# Patient Record
Sex: FEMALE | Race: OTHER | Employment: UNEMPLOYED | URBAN - METROPOLITAN AREA
[De-identification: names, ages, dates, MRNs, and addresses within clinical notes are randomized per-mention and may not be internally consistent; named-entity substitution may affect disease eponyms.]

---

## 2024-01-18 ENCOUNTER — OFFICE VISIT (OUTPATIENT)
Dept: INTERNAL MEDICINE CLINIC | Facility: CLINIC | Age: 44
End: 2024-01-18
Payer: MEDICAID

## 2024-01-18 ENCOUNTER — LAB ENCOUNTER (OUTPATIENT)
Dept: LAB | Facility: HOSPITAL | Age: 44
End: 2024-01-18
Attending: INTERNAL MEDICINE
Payer: MEDICAID

## 2024-01-18 VITALS
HEIGHT: 66 IN | OXYGEN SATURATION: 97 % | BODY MASS INDEX: 29.25 KG/M2 | WEIGHT: 182 LBS | DIASTOLIC BLOOD PRESSURE: 70 MMHG | HEART RATE: 106 BPM | TEMPERATURE: 99 F | SYSTOLIC BLOOD PRESSURE: 106 MMHG

## 2024-01-18 DIAGNOSIS — Z12.11 ENCOUNTER FOR SCREENING COLONOSCOPY: ICD-10-CM

## 2024-01-18 DIAGNOSIS — Z00.00 ANNUAL PHYSICAL EXAM: Primary | ICD-10-CM

## 2024-01-18 DIAGNOSIS — Z00.00 ANNUAL PHYSICAL EXAM: ICD-10-CM

## 2024-01-18 DIAGNOSIS — Z12.31 SCREENING MAMMOGRAM FOR BREAST CANCER: ICD-10-CM

## 2024-01-18 DIAGNOSIS — K59.00 CONSTIPATION, UNSPECIFIED CONSTIPATION TYPE: ICD-10-CM

## 2024-01-18 DIAGNOSIS — Z12.4 PAP SMEAR FOR CERVICAL CANCER SCREENING: ICD-10-CM

## 2024-01-18 LAB
ALBUMIN SERPL-MCNC: 4.3 G/DL (ref 3.2–4.8)
ALBUMIN/GLOB SERPL: 1.6 {RATIO} (ref 1–2)
ALP LIVER SERPL-CCNC: 46 U/L
ALT SERPL-CCNC: 12 U/L
ANION GAP SERPL CALC-SCNC: 0 MMOL/L (ref 0–18)
AST SERPL-CCNC: 16 U/L (ref ?–34)
BASOPHILS # BLD AUTO: 0.04 X10(3) UL (ref 0–0.2)
BASOPHILS NFR BLD AUTO: 0.7 %
BILIRUB SERPL-MCNC: 0.9 MG/DL (ref 0.3–1.2)
BUN BLD-MCNC: 12 MG/DL (ref 9–23)
BUN/CREAT SERPL: 14.8 (ref 10–20)
CALCIUM BLD-MCNC: 9.3 MG/DL (ref 8.7–10.4)
CHLORIDE SERPL-SCNC: 112 MMOL/L (ref 98–112)
CHOLEST SERPL-MCNC: 187 MG/DL (ref ?–200)
CO2 SERPL-SCNC: 29 MMOL/L (ref 21–32)
CREAT BLD-MCNC: 0.81 MG/DL
DEPRECATED RDW RBC AUTO: 43 FL (ref 35.1–46.3)
EGFRCR SERPLBLD CKD-EPI 2021: 91 ML/MIN/1.73M2 (ref 60–?)
EOSINOPHIL # BLD AUTO: 0.07 X10(3) UL (ref 0–0.7)
EOSINOPHIL NFR BLD AUTO: 1.2 %
ERYTHROCYTE [DISTWIDTH] IN BLOOD BY AUTOMATED COUNT: 12.5 % (ref 11–15)
EST. AVERAGE GLUCOSE BLD GHB EST-MCNC: 105 MG/DL (ref 68–126)
FASTING PATIENT LIPID ANSWER: YES
FASTING STATUS PATIENT QL REPORTED: YES
GLOBULIN PLAS-MCNC: 2.7 G/DL (ref 2.8–4.4)
GLUCOSE BLD-MCNC: 87 MG/DL (ref 70–99)
HBA1C MFR BLD: 5.3 % (ref ?–5.7)
HCT VFR BLD AUTO: 43 %
HDLC SERPL-MCNC: 40 MG/DL (ref 40–59)
HGB BLD-MCNC: 14.3 G/DL
IMM GRANULOCYTES # BLD AUTO: 0.01 X10(3) UL (ref 0–1)
IMM GRANULOCYTES NFR BLD: 0.2 %
LDLC SERPL CALC-MCNC: 129 MG/DL (ref ?–100)
LYMPHOCYTES # BLD AUTO: 1.6 X10(3) UL (ref 1–4)
LYMPHOCYTES NFR BLD AUTO: 27.4 %
MCH RBC QN AUTO: 31.3 PG (ref 26–34)
MCHC RBC AUTO-ENTMCNC: 33.3 G/DL (ref 31–37)
MCV RBC AUTO: 94.1 FL
MONOCYTES # BLD AUTO: 0.43 X10(3) UL (ref 0.1–1)
MONOCYTES NFR BLD AUTO: 7.4 %
NEUTROPHILS # BLD AUTO: 3.7 X10 (3) UL (ref 1.5–7.7)
NEUTROPHILS # BLD AUTO: 3.7 X10(3) UL (ref 1.5–7.7)
NEUTROPHILS NFR BLD AUTO: 63.1 %
NONHDLC SERPL-MCNC: 147 MG/DL (ref ?–130)
OSMOLALITY SERPL CALC.SUM OF ELEC: 291 MOSM/KG (ref 275–295)
PLATELET # BLD AUTO: 216 10(3)UL (ref 150–450)
POTASSIUM SERPL-SCNC: 4.7 MMOL/L (ref 3.5–5.1)
PROT SERPL-MCNC: 7 G/DL (ref 5.7–8.2)
RBC # BLD AUTO: 4.57 X10(6)UL
SODIUM SERPL-SCNC: 141 MMOL/L (ref 136–145)
TRIGL SERPL-MCNC: 96 MG/DL (ref 30–149)
TSI SER-ACNC: 3.7 MIU/ML (ref 0.55–4.78)
VLDLC SERPL CALC-MCNC: 17 MG/DL (ref 0–30)
WBC # BLD AUTO: 5.9 X10(3) UL (ref 4–11)

## 2024-01-18 PROCEDURE — 36415 COLL VENOUS BLD VENIPUNCTURE: CPT | Performed by: INTERNAL MEDICINE

## 2024-01-18 PROCEDURE — 85025 COMPLETE CBC W/AUTO DIFF WBC: CPT | Performed by: INTERNAL MEDICINE

## 2024-01-18 PROCEDURE — 80061 LIPID PANEL: CPT | Performed by: INTERNAL MEDICINE

## 2024-01-18 PROCEDURE — 84443 ASSAY THYROID STIM HORMONE: CPT

## 2024-01-18 PROCEDURE — 83036 HEMOGLOBIN GLYCOSYLATED A1C: CPT

## 2024-01-18 PROCEDURE — 80053 COMPREHEN METABOLIC PANEL: CPT

## 2024-01-18 PROCEDURE — 99386 PREV VISIT NEW AGE 40-64: CPT | Performed by: INTERNAL MEDICINE

## 2024-01-18 RX ORDER — MULTIVITAMIN WITH IRON
50 TABLET ORAL DAILY
COMMUNITY

## 2024-01-18 RX ORDER — SENNOSIDES A AND B 8.6 MG/1
1 TABLET, FILM COATED ORAL 2 TIMES DAILY PRN
Qty: 60 TABLET | Refills: 1 | Status: SHIPPED | OUTPATIENT
Start: 2024-01-18

## 2024-01-23 NOTE — PROGRESS NOTES
[facility]  New Patient History and Physical      HPI:     Chief Complaint   Patient presents with    [facility]    Abdominal Pain       Enedelia Bello is a 44 year old female presenting for:  Establish care.  Has  has no past medical history on file.      Reports hx of constipation.   Pain with bloating.      Requesting preventative lab analysis.      Labs:   Complete Metabolic Panel:  Lab Results   Component Value Date/Time     01/18/2024 11:35 AM    K 4.7 01/18/2024 11:35 AM     01/18/2024 11:35 AM    CO2 29.0 01/18/2024 11:35 AM    CREATSERUM 0.81 01/18/2024 11:35 AM    CA 9.3 01/18/2024 11:35 AM    GLU 87 01/18/2024 11:35 AM    TP 7.0 01/18/2024 11:35 AM    ALB 4.3 01/18/2024 11:35 AM    ALKPHO 46 01/18/2024 11:35 AM    AST 16 01/18/2024 11:35 AM    ALT 12 01/18/2024 11:35 AM    BILT 0.9 01/18/2024 11:35 AM    TSH 3.703 01/18/2024 11:35 AM        CBC:  @LABRCNTIP(RBC:3,HGB:3,HCT:3,MCV:3,MCH:3,MCHC:3,RDW:3,NEPRELIM:3,WBC:3,PLT:3)@       Hemoglobin A1C, Microalbumin  Lab Results   Component Value Date/Time    A1C 5.3 01/18/2024 11:35 AM        Lipid panel  Lab Results   Component Value Date/Time    CHOLEST 187 01/18/2024 11:35 AM    HDL 40 01/18/2024 11:35 AM    TRIG 96 01/18/2024 11:35 AM     (H) 01/18/2024 11:35 AM    NONHDLC 147 (H) 01/18/2024 11:35 AM     The 10-year ASCVD risk score (Uli MURDOCK, et al., 2019) is: 0.8%    Values used to calculate the score:      Age: 44 years      Sex: Female      Is Non- : No      Diabetic: No      Tobacco smoker: No      Systolic Blood Pressure: 106 mmHg      Is BP treated: No      HDL Cholesterol: 40 mg/dL      Total Cholesterol: 187 mg/dL       Medications:  Current Outpatient Medications   Medication Sig Dispense Refill    Bisacodyl (STIMULANT LAXATIVE OR) Take by mouth.      vitamin B-12 50 MCG Oral Tab Take 1 tablet (50 mcg total) by mouth daily.      sennosides (SENOKOT) 8.6 MG Oral Tab  Take 1 tablet (8.6 mg total) by mouth 2 (two) times daily as needed. 60 tablet 1      PMH:  History reviewed. No pertinent past medical history.      PSH:  Past Surgical History:   Procedure Laterality Date    APPENDECTOMY      CHOLECYSTECTOMY         Allergies:  Allergies   Allergen Reactions    Penicillins SWELLING     eyes      Social History:  Social History     Socioeconomic History    Marital status:    Tobacco Use    Smoking status: Never     Passive exposure: Never    Smokeless tobacco: Never   Substance and Sexual Activity    Alcohol use: Never    Drug use: Never        Family History:  Family History   Problem Relation Age of Onset    Heart Disorder Father     No Known Problems Mother     No Known Problems Daughter     No Known Problems Son           REVIEW OF SYSTEMS:   Review of Systems   Constitutional:  Negative for chills, fatigue, fever and unexpected weight change.   HENT:  Negative for congestion, ear pain, hearing loss, rhinorrhea, sinus pain and sore throat.    Eyes:  Negative for pain, redness and visual disturbance.   Respiratory:  Negative for apnea, cough, chest tightness, shortness of breath and wheezing.    Cardiovascular:  Negative for chest pain, palpitations and leg swelling.   Gastrointestinal:  Positive for abdominal pain and constipation. Negative for abdominal distention, blood in stool and nausea.   Endocrine: Negative for cold intolerance, heat intolerance and polyuria.   Genitourinary:  Negative for dysuria, hematuria and urgency.   Musculoskeletal:  Negative for arthralgias, back pain, gait problem, joint swelling, myalgias and neck pain.   Skin:  Negative for rash and wound.   Allergic/Immunologic: Negative for food allergies and immunocompromised state.   Neurological:  Negative for dizziness, seizures, facial asymmetry, speech difficulty, weakness, light-headedness, numbness and headaches.   Hematological:  Negative for adenopathy. Does not bruise/bleed easily.    Psychiatric/Behavioral:  Negative for behavioral problems, sleep disturbance and suicidal ideas. The patient is not nervous/anxious.             PHYSICAL EXAM:   /70 (BP Location: Right arm, Patient Position: Sitting, Cuff Size: adult)   Pulse 106   Temp 99.1 °F (37.3 °C) (Temporal)   Ht 5' 6\" (1.676 m)   Wt 182 lb (82.6 kg)   LMP 01/09/2024   SpO2 97%   BMI 29.38 kg/m²  Estimated body mass index is 29.38 kg/m² as calculated from the following:    Height as of this encounter: 5' 6\" (1.676 m).    Weight as of this encounter: 182 lb (82.6 kg).     Wt Readings from Last 3 Encounters:   01/18/24 182 lb (82.6 kg)       Physical Exam  Vitals reviewed.   Constitutional:       General: She is not in acute distress.     Appearance: She is well-developed.   HENT:      Head: Normocephalic and atraumatic.   Eyes:      Conjunctiva/sclera: Conjunctivae normal.      Pupils: Pupils are equal, round, and reactive to light.   Neck:      Thyroid: No thyromegaly.   Cardiovascular:      Rate and Rhythm: Normal rate and regular rhythm.      Heart sounds: Normal heart sounds, S1 normal and S2 normal. No murmur heard.     No friction rub. No gallop.   Pulmonary:      Effort: Pulmonary effort is normal. No respiratory distress.      Breath sounds: Normal breath sounds. No wheezing or rales.   Chest:      Chest wall: No tenderness.   Abdominal:      General: Bowel sounds are normal. There is no distension.      Palpations: Abdomen is soft. There is no mass.      Tenderness: There is no abdominal tenderness. There is no guarding or rebound.   Musculoskeletal:         General: No tenderness. Normal range of motion.      Cervical back: Normal range of motion.   Lymphadenopathy:      Cervical: No cervical adenopathy.   Skin:     General: Skin is warm.      Findings: No erythema or rash.   Neurological:      Mental Status: She is alert and oriented to person, place, and time.      Cranial Nerves: No cranial nerve deficit.      Deep  Tendon Reflexes: Reflexes are normal and symmetric.   Psychiatric:         Behavior: Behavior normal.         Thought Content: Thought content normal.         Judgment: Judgment normal.             ASSESSMENT AND PLAN:   Patient is a 44 year old female who presents primarily presents for:            (Z00.00) Annual physical exam  (primary encounter diagnosis)  Plan: Hemoglobin A1C (Glycohemoglobin) [E], Comp         Metabolic Panel (14) [E], CBC With Differential        With Platelet, Lipid Panel          During the annual physical exam I spent extensive time discussing medical history including existing conditions, past surgeries, allergies and medications.  Lifestyle factors discussed including diet, exercise and substance abuse including alcohol and tobacco if warranted.  Counseled on screening tests based on age and underlying risk factors which may include but not limited to blood pressure measurement, cholesterol screening, diabetes screening and prostate cancer screening.  Preventative screenings discussed.  Reviewed immunizations.  Sexual health discussed if appropriate.  Family history reviewed.  I addressed any specific concerns or symptoms that the patient had.        (Z12.11) Encounter for screening colonoscopy  Plan: Gastro GI Telephone Colon Screen            (K59.00) Constipation, unspecified constipation type  Plan: TSH W Reflex To Free T4 [E]            (Z12.4) Pap smear for cervical cancer screening  Plan: OBG Referral - In Network            (Z12.31) Screening mammogram for breast cancer  Plan: [facility] 2D+3D SCREENING BILAT         (CPT=77067/35437)                 Health Maintenance:  Health Maintenance   Topic Date Due    Annual Physical  Never done    Colorectal Cancer Screening  Never done    Mammogram  Never done    COVID-19 Vaccine (1) Never done    DTaP,Tdap,and Td Vaccines (1 - Tdap) Never done    Pap Smear  Never done    Influenza Vaccine (1) Never done    Annual Depression Screening   Never done    Pneumococcal Vaccine: Birth to 64yrs  Aged Out             Meds & Refills for this Visit:  Requested Prescriptions     Signed Prescriptions Disp Refills    sennosides (SENOKOT) 8.6 MG Oral Tab 60 tablet 1     Sig: Take 1 tablet (8.6 mg total) by mouth 2 (two) times daily as needed.       Orders Placed This Encounter   Procedures    TSH W Reflex To Free T4 [E]    Hemoglobin A1C (Glycohemoglobin) [E]    Comp Metabolic Panel (14) [E]    CBC With Differential With Platelet    Lipid Panel       Imaging & Consults:  OBG - INTERNAL  OP REFERRAL TO [facility] GI TELEPHONE COLON SCREEN  [facility] 2D+3D SCREENING BILAT (CPT=77067/91148)        Caesar Carney MD     Return in about 6 months (around 7/18/2024).  Important issues to follow up on next visit      Patient indicates understanding of the above recommendations and agrees to the above plan.  Reasurrance and education provided. All questions answered.  Notified to call with any questions, complications, allergies, or worsening or changing symptoms as well as any side effects or complications from the treatments .  Red flags/ ER precautions discussed.    This note was produced using voice recognition software.  As a result, errors may occur.  When identified, these errors have been corrected.  While every attempt is made to correct errors during dictation, errors may still exist.    Total time spent was 48 minutes which includes: Preparation to see patient including chart review, reviewing appropriate medical history, cunseling and education (diet and exercise), discussing treatment options, ordering appropriate diagnostic tests and documentation.    Caesar Carney MD  Internal Medicine/Primary Care  [facility]

## 2024-03-21 ENCOUNTER — TELEPHONE (OUTPATIENT)
Facility: CLINIC | Age: 44
End: 2024-03-21

## 2024-03-21 ENCOUNTER — OFFICE VISIT (OUTPATIENT)
Facility: CLINIC | Age: 44
End: 2024-03-21
Payer: MEDICAID

## 2024-03-21 VITALS
SYSTOLIC BLOOD PRESSURE: 123 MMHG | HEART RATE: 82 BPM | HEIGHT: 66 IN | WEIGHT: 186 LBS | DIASTOLIC BLOOD PRESSURE: 84 MMHG | BODY MASS INDEX: 29.89 KG/M2

## 2024-03-21 DIAGNOSIS — R14.0 ABDOMINAL BLOATING: Primary | ICD-10-CM

## 2024-03-21 DIAGNOSIS — M62.89 PELVIC FLOOR DYSFUNCTION: ICD-10-CM

## 2024-03-21 DIAGNOSIS — Z12.11 COLON CANCER SCREENING: Primary | ICD-10-CM

## 2024-03-21 DIAGNOSIS — K59.04 CHRONIC IDIOPATHIC CONSTIPATION: ICD-10-CM

## 2024-03-21 NOTE — H&P
[facility] - Gastroenterology                                                                                                               Reason for consult: GERD    Requesting physician or provider: Caesar Carney MD    Chief Complaint   Patient presents with    Colonoscopy Screening    Constipation    Gastro-esophageal Reflux       HPI:   Enedelia Bello is a 44 year old year-old female with history of appendectomy, cholecystectomy who presents for constipation and incomplete evacuation.    -Albanian speaking patient, she would like her daughter/ to translate.  offered she declines  -having problems using bathroom  -lower ab pain, bloating  -when pain is severe, it travels to the legs and back  -she moves her bowels, but cannot move her bowels, 20 min, nothing coming out  -feeling of incomplete evacuation  -vaginal deliveries in the past after pregnancies  -History of stress urinary incontinence  - She has not tried any consistent laxatives  - Feels it is not easy to have a bowel movement, the symptoms have been going on since 2020 she says  - Denies any family history of GI malignancy  - No melena or hematochezia  - No odynophagia or dysphagia  - No unusual weight loss  - No NSAID use or and no blood thinner use      Prior endoscopies:  none    Soc:  -no smoking  -no Etoh    Wt Readings from Last 6 Encounters:   03/21/24 186 lb (84.4 kg)   01/18/24 182 lb (82.6 kg)        History, Medications, Allergies, ROS:      History reviewed. No pertinent past medical history.   Past Surgical History:   Procedure Laterality Date    APPENDECTOMY      CHOLECYSTECTOMY        Family Hx:   Family History   Problem Relation Age of Onset    Heart Disorder Father     No Known Problems Mother     No Known Problems Daughter     No Known Problems Son     Colon Cancer Neg       Social History:   Social History      Socioeconomic History    Marital status:    Tobacco Use    Smoking status: Never     Passive exposure: Never    Smokeless tobacco: Never   Substance and Sexual Activity    Alcohol use: Never    Drug use: Never        Medications (Active prior to today's visit):  Current Outpatient Medications   Medication Sig Dispense Refill    Bisacodyl (STIMULANT LAXATIVE OR) Take by mouth.      vitamin B-12 50 MCG Oral Tab Take 1 tablet (50 mcg total) by mouth daily.      sennosides (SENOKOT) 8.6 MG Oral Tab Take 1 tablet (8.6 mg total) by mouth 2 (two) times daily as needed. 60 tablet 1       Allergies:  Allergies   Allergen Reactions    Penicillins SWELLING     eyes       ROS:   CONSTITUTIONAL:  negative for fevers, rigors  EYES:  negative for diplopia   RESPIRATORY:  negative for severe shortness of breath  CARDIOVASCULAR:  negative for crushing sub-sternal chest pain  GASTROINTESTINAL:  see HPI  GENITOURINARY:  negative for dysuria or gross hematuria  INTEGUMENT/BREAST:  SKIN:  negative for jaundice   ALLERGIC/IMMUNOLOGIC:  negative for hay fever  ENDOCRINE:  negative for cold intolerance and heat intolerance  MUSCULOSKELETAL:  negative for joint effusion/severe erythema  BEHAVIOR/PSYCH:  negative for psychotic behavior      PHYSICAL EXAM:   Blood pressure 123/84, pulse 82, height 5' 6\" (1.676 m), weight 186 lb (84.4 kg), last menstrual period 01/09/2024.    Gen- Patient appears comfortable and in no acute discomfort  HEENT: the sclera appears anicteric, oropharynx clear, mucus membranes appear moist  CV- regular rate and rhythm, the extremities are warm and well perfused   Lung- Moves air well; No labored breathing  Abdomen- soft, non-tender exam in all quadrants without rigidity or guarding, non-distended, no abnormal bowel sounds noted, no masses are palpated  Skin- No jaundice  Ext: no cyanosis, clubbing or edema is evident.   Neuro- Alert and interactive, and gross movements of extremities normal  Psych -  appropriate, non-agitated    Labs/Imaging:     Patient's pertinent labs and imaging were reviewed and discussed with patient today.      ASSESSMENT/PLAN:   Enedelia Bello is a 44 year old year-old female with history of appendectomy, cholecystectomy who presents for constipation and incomplete evacuation. Romanian speaking.    #Chronic constipation  #Pelvic floor dysfunction  #Stress urinary incontinence    >> Symptoms ongoing for the last 4 years, she has a history of vaginal deliveries and signs and symptoms of pelvic floor dysfunction.  She has no rectal bleeding or weight loss or any red flag symptoms.  However she has not had a colonoscopy and I do recommend this.  If the colonoscopy is normal I suspect that she will need pelvic floor physical therapy.  In the meantime we will give her a flush out with GoLytely, risks and benefits discussed.  After that she should take MiraLAX and Dulcolax on a consistent basis.  Patient and family agree to plan all questions answered.    Recommendations:    1. Schedule colonoscopy with IV or MAC [Diagnosis: screening]    2.  bowel prep from pharmacy (split [employer])  --Buy over the counter dulcolax laxative, and take one tablet daily for 3 days prior to drinking the bowel prep.     3. Continue all medications as normal for your procedure.    4. Read all bowel prep instructions carefully. Bowel prep instructions can also be found online at:  [email or web address].org/giprep     5. AVOID seeds, nuts, popcorn, raw fruits and vegetables for 3 days before procedure    6. You MAY need to go for COVID testing 72 hours before procedure. The testing team will call you a few days before your procedure to discuss with you if testing is required. If you are asked to go for COVID testing and do not completed the test, the procedure cannot be performed.     7. If you start any NEW medication after your visit today, please notify us. Certain medications (like iron or weight loss  medications) will need to be held before the procedure, or the procedure cannot be performed safely.        8. DO flushout first and then the next day start miralax twice a day (1 tablespoon with water) and add dulcolax three time a week (mon,wed,Friday).    9. Correct toilet positioning discussed      ------------    WASHOUT INSTRUCTIONS:  1. Pick a day you will be at home, close to a toilet.  2. Have a some juice for breakfast.   3. Around 8AM start drinking the solution prescribed (for trilyte, drink 1 cup every 15 minutes) over the course of 3-5 hours. IF having nausea/bloating, take a break for 30 minutes, walk around and then resume drinking. If vomiting, take a break for 1 hour, if symptoms improve, and you feel well, can resume drinking.  4. Goal is to finish solution or until stools turn liquid yellow.  5. For lunch you should have a liquid diet (7up, water, gingerale, etc)  6. After prep, for dinner you can have a light dinner.  7. Resume regular meals the following day, along with laxative medications.  8. You should continue your regular medications during the washout day.         Colonoscopy consent: I have discussed the risks, benefits, and alternatives to colonoscopy with the patient/primary decision maker [who demonstrated understanding], including but not limited to the risks of bleeding, infection, pain, death, as well as the risks of anesthesia and perforation all leading to prolonged hospitalization, surgical intervention, or even death. I also specifically mentioned the miss rate of colonoscopy of 5-10% in the best of all circumstances. The patient has agreed to sign an informed consent and elected to proceed with colonoscopy with possible intervention [i.e. polypectomy, stent placement, etc.] as indicated.         Orders This Visit:  No orders of the defined types were placed in this encounter.      Meds This Visit:  Requested Prescriptions      No prescriptions requested or ordered in this  encounter       Imaging & Referrals:  None         LOREN Vines MD  Pager: [phone]  3/21/2024        This note was partially prepared using Dragon Medical voice recognition dictation software. As a result, errors may occur. When identified, these errors have been corrected. While every attempt is made to correct errors during dictation, discrepancies may still exist.

## 2024-03-21 NOTE — TELEPHONE ENCOUNTER
30 min / IV sedation ok per Dr Vines       Scheduled for:  Colonoscopy [address]     Location:  [facility] ([facility])  Provider:  Dr Vines    Date:  4/30/2024  Time:   3:30PM (Patient aware to arrive ONE hour early)    Sedation:  IV (Conscious sedation )   Prep:  Split GoLytely -  dulcolax laxative, and take one tablet daily for 3 days prior to drinking the bowel prep.        Diagnosis with codes:    ICD-10-CM   1. Colon cancer screening  Z12.11        Meds/Allergies Reconciled?:   Provider Reviewed   Was patient informed to call insurance with codes (Y/N):  Yes  Referral sent?: Referral was sent at the time of electronic surgical scheduling.  [facility] or [facility] notified?: I sent an electronic request to ENDO Scheduling and received a confirmation today.     Medication Orders:  Patient is aware to NOT take iron pills, herbal meds and diet supplements for 7 days before exam. Also to NOT take any form of alcohol, recreational drugs and any forms of ED meds 24 hours before exam.       Misc Orders:  N/A   Further instructions given by staff:  I Provided prep instructions to patient and reviewed date, time and location. Patient verbalized that  She / Her understood and is aware to call with any questions.  Patient was informed about the new cancellation policy for She / Her procedure. Patient was also given copy of the cancellation policy at the time of the appointment and verbalized understanding.

## 2024-03-21 NOTE — PATIENT INSTRUCTIONS
1. Schedule colonoscopy with IV or MAC [Diagnosis: screening]    2.  bowel prep from pharmacy ([employer])  --Buy over the counter dulcolax laxative, and take one tablet daily for 3 days prior to drinking the bowel prep.     3. Continue all medications as normal for your procedure.    4. Read all bowel prep instructions carefully. Bowel prep instructions can also be found online at:  www.health.org/giprep     5. AVOID seeds, nuts, popcorn, raw fruits and vegetables for 3 days before procedure    6. You MAY need to go for COVID testing 72 hours before procedure. The testing team will call you a few days before your procedure to discuss with you if testing is required. If you are asked to go for COVID testing and do not completed the test, the procedure cannot be performed.     7. If you start any NEW medication after your visit today, please notify us. Certain medications (like iron or weight loss medications) will need to be held before the procedure, or the procedure cannot be performed safely.        8. DO flushout first and then the next day start miralax twice a day (1 tablespoon with water) and add dulcolax three time a week (mon,wed,Friday).        ------------    WASHOUT INSTRUCTIONS:  1. Pick a day you will be at home, close to a toilet.  2. Have a some juice for breakfast.   3. Around 8AM start drinking the solution prescribed (for trilyte, drink 1 cup every 15 minutes) over the course of 3-5 hours. IF having nausea/bloating, take a break for 30 minutes, walk around and then resume drinking. If vomiting, take a break for 1 hour, if symptoms improve, and you feel well, can resume drinking.  4. Goal is to finish solution or until stools turn liquid yellow.  5. For lunch you should have a liquid diet (7up, water, gingerale, etc)  6. After prep, for dinner you can have a light dinner.  7. Resume regular meals the following day, along with laxative medications.  8. You should continue your regular  medications during the washout day.

## 2024-04-26 ENCOUNTER — TELEPHONE (OUTPATIENT)
Facility: CLINIC | Age: 44
End: 2024-04-26

## 2024-04-30 ENCOUNTER — HOSPITAL ENCOUNTER (OUTPATIENT)
Facility: HOSPITAL | Age: 44
Setting detail: HOSPITAL OUTPATIENT SURGERY
Discharge: HOME | End: 2024-04-30
Attending: INTERNAL MEDICINE | Admitting: INTERNAL MEDICINE
Payer: MEDICAID

## 2024-04-30 VITALS
WEIGHT: 185 LBS | HEART RATE: 62 BPM | SYSTOLIC BLOOD PRESSURE: 90 MMHG | RESPIRATION RATE: 18 BRPM | BODY MASS INDEX: 29.73 KG/M2 | DIASTOLIC BLOOD PRESSURE: 54 MMHG | HEIGHT: 66 IN | OXYGEN SATURATION: 97 %

## 2024-04-30 DIAGNOSIS — Z12.11 COLON CANCER SCREENING: ICD-10-CM

## 2024-04-30 PROBLEM — K64.8 INTERNAL HEMORRHOIDS: Status: ACTIVE | Noted: 2024-04-30

## 2024-04-30 PROBLEM — Q43.8 TORTUOUS COLON: Status: ACTIVE | Noted: 2024-04-30

## 2024-04-30 LAB — B-HCG UR QL: NEGATIVE

## 2024-04-30 PROCEDURE — 99152 MOD SED SAME PHYS/QHP 5/>YRS: CPT | Performed by: INTERNAL MEDICINE

## 2024-04-30 PROCEDURE — 0DJD8ZZ INSPECTION OF LOWER INTESTINAL TRACT, VIA NATURAL OR ARTIFICIAL OPENING ENDOSCOPIC: ICD-10-PCS | Performed by: INTERNAL MEDICINE

## 2024-04-30 PROCEDURE — 81025 URINE PREGNANCY TEST: CPT

## 2024-04-30 PROCEDURE — 99153 MOD SED SAME PHYS/QHP EA: CPT | Performed by: INTERNAL MEDICINE

## 2024-04-30 RX ORDER — MIDAZOLAM HYDROCHLORIDE 1 MG/ML
INJECTION INTRAMUSCULAR; INTRAVENOUS
Status: DISCONTINUED | OUTPATIENT
Start: 2024-04-30 | End: 2024-04-30

## 2024-04-30 RX ORDER — SODIUM CHLORIDE 0.9 % (FLUSH) 0.9 %
10 SYRINGE (ML) INJECTION AS NEEDED
Status: DISCONTINUED | OUTPATIENT
Start: 2024-04-30 | End: 2024-04-30

## 2024-04-30 RX ORDER — MIDAZOLAM HYDROCHLORIDE 1 MG/ML
1 INJECTION INTRAMUSCULAR; INTRAVENOUS EVERY 5 MIN PRN
Status: DISCONTINUED | OUTPATIENT
Start: 2024-04-30 | End: 2024-04-30

## 2024-04-30 RX ORDER — SODIUM CHLORIDE, SODIUM LACTATE, POTASSIUM CHLORIDE, CALCIUM CHLORIDE 600; 310; 30; 20 MG/100ML; MG/100ML; MG/100ML; MG/100ML
INJECTION, SOLUTION INTRAVENOUS CONTINUOUS
Status: DISCONTINUED | OUTPATIENT
Start: 2024-04-30 | End: 2024-04-30

## 2024-04-30 NOTE — DISCHARGE INSTRUCTIONS
Home Care Instructions for Colonoscopy and/or Gastroscopy with Sedation    Diet:  - Resume your regular diet as tolerated unless otherwise instructed.  - Start with light meals to minimize bloating.  - Do not drink alcohol today.    Medication:  - If you have questions about resuming your normal medications, please contact your Primary Care Physician.    Activities:  - Take it easy today. Do not return to work today.  - Do not drive today.  - Do not operate any machinery today (including kitchen equipment).    Colonoscopy:  - You may notice some rectal \"spotting\" (a little blood on the toilet tissue) for a day or two after the exam. This is normal.  - If you experience any rectal bleeding (not spotting), persistent tenderness or sharp severe abdominal pains, oral temperature over 100 degrees Fahrenheit, light-headedness or dizziness, or any other problems, contact your doctor.      **If unable to reach your doctor, please go to the [facility] Emergency Room**    - Your referring physician will receive a full report of your examination.  - If you do not hear from your doctor's office within two weeks of your biopsy, please call them for your results.    You may be able to see your laboratory results in [employer] between 4 and 7 business days.  In some cases, your physician may not have viewed the results before they are released to [employer].  If you have questions regarding your results contact the physician who ordered the test/exam by phone or via [employer]â„[employer] by choosing \"Ask a Medical Question.\"

## 2024-04-30 NOTE — H&P
History & Physical Examination    Patient Name: Enedelia Bello  MRN: [id number]  [facility]: [id number]  YOB: 1980    Diagnosis: screening for colon cancer    Medications Prior to Admission   Medication Sig Dispense Refill Last Dose    Bisacodyl (STIMULANT LAXATIVE OR) Take by mouth.       vitamin B-12 50 MCG Oral Tab Take 1 tablet (50 mcg total) by mouth daily.   2024    sennosides (SENOKOT) 8.6 MG Oral Tab Take 1 tablet (8.6 mg total) by mouth 2 (two) times daily as needed. 60 tablet 1 2024    [] polyethylene glycol, PEG 3350-KCl-NaBcb-NaCl-NaSulf, 236 g Oral Recon Soln Take 4,000 mL by mouth once for 1 dose. Take as directed by your Gastroenterology clinic. 4000 mL 0     [] polyethylene glycol, PEG 3350-KCl-NaBcb-NaCl-NaSulf, 236 g Oral Recon Soln Take 4,000 mL by mouth once for 1 dose. As directed by GI clinic instructions. 4000 mL 0      Current Facility-Administered Medications   Medication Dose Route Frequency    sodium chloride 0.9% 0.9% flush injection 10 mL  10 mL Intravenous PRN    lactated ringers infusion   Intravenous Continuous    midazolam (Versed) 2 MG/2ML injection 1 mg  1 mg Intravenous Q5 Min PRN    fentaNYL (Sublimaze) 50 mcg/mL injection 25 mcg  25 mcg Intravenous Q5 Min PRN       Allergies:   Allergies   Allergen Reactions    Penicillins SWELLING     eyes       Past Medical History:    High cholesterol     Past Surgical History:   Procedure Laterality Date    Appendectomy      Cholecystectomy       Family History   Problem Relation Age of Onset    Heart Disorder Father     No Known Problems Mother     No Known Problems Daughter     No Known Problems Son     Colon Cancer Neg      Social History     Tobacco Use    Smoking status: Never     Passive exposure: Never    Smokeless tobacco: Never   Substance Use Topics    Alcohol use: Never       SYSTEM Check if Review is Normal Check if Physical Exam is Normal If not normal, please explain:   HEENT [X ] [ X]     NECK  [X ] [ X]    HEART [X ] [ X]    LUNGS [X ] [ X]    ABDOMEN [X ] [ X]    EXTREMITIES [X ] [ X]    OTHER        I have discussed the risks and benefits and alternatives of the procedure with the patient/family.  They understand and agree to proceed with plan of care.   I have reviewed the History and Physical done within the last 30 days.  Any changes noted above.    LOREN Vines MD  [facility] - Gastroenterology  4/30/2024  3:13 PM

## 2024-04-30 NOTE — OPERATIVE REPORT
COLONOSCOPY REPORT    Enedelia Bello     1980 Age 44 year old   PCP Caesar Carney MD Endoscopist Jack Vines MD     Date of procedure: 24    Procedure: Colonoscopy     Pre-operative diagnosis: screening    Post-operative diagnosis: tortuous colon, internal hemorrhoids    Medications: Versed 8 mg IV push.  Fentanyl 100 mcg IV push. [Per my order and under my supervision, the patient was sedated with intermittent intravenous doses of versed and fentanyl. The vital signs were monitored and recorded by an experienced RN. The procedure started after the patient was adequately sedated. Total time for moderate conscious sedation was 18 minutes].    Withdrawal time: 8 minutes    Procedure:  Informed consent was obtained from the patient after the risks of the procedure were discussed, including but not limited to bleeding, perforation, aspiration, infection, or possibility of a missed lesion. After discussions of the risks/benefits and alternatives to this procedure, as well as the planned sedation, the patient was placed in the left lateral decubitus position and begun on continuous blood pressure pulse oximetry and EKG monitoring and this was maintained throughout the procedure. Once an adequate level of sedation was obtained a digital rectal exam was completed. Then the lubricated tip of the Htnarat-XDWMD-299 diagnostic video colonoscope was inserted and advanced without difficulty to the cecum using the CO2 insufflation technique. The cecum was identified by localizing the trifold, the appendix and the ileocecal valve. Withdrawal was begun with thorough washing and careful examination of the colonic walls and folds. A routine second examination of the cecum/ascending colon was performed. Photodocumentation was obtained. The bowel prep was good. Views of the colon were good with washing. I then carefully withdrew the instrument from the patient who tolerated the procedure well.      Complications: none.    Findings:   1. No polyp(s) noted.    2. Diverticulosis: none.    3. Terminal ileum: the visualized mucosa appeared normal.    4. The colonic mucosa throughout the colon showed normal vascular pattern, without evidence of angioectasias or inflammation. Tortuous sigmoid colon.    5. A retroflexed view of the rectum revealed small internal hemorrhoids.    6. TIFFANIE: normal rectal tone, no masses palpated.     Impression:   Normal colonoscopy to the terminal ileum, other than small internal hemorrhoids.   Tortuous sigmoid colon.     Recommend:  Repeat CLN in 10 years. If new signs or symptoms develop, colonoscopy may need to be repeated sooner.   High fiber diet.    Specimens: none  Blood loss: <1 ml

## 2024-05-19 ENCOUNTER — TELEPHONE (OUTPATIENT)
Facility: CLINIC | Age: 44
End: 2024-05-19

## 2024-05-20 NOTE — TELEPHONE ENCOUNTER
Health Maintenance Updated.    10 year colonoscopy recall entered into patient outreach in [facility].  Next colonoscopy will be due 4/30/2034.

## 2024-07-18 ENCOUNTER — OFFICE VISIT (OUTPATIENT)
Dept: INTERNAL MEDICINE CLINIC | Facility: CLINIC | Age: 44
End: 2024-07-18
Payer: MEDICAID

## 2024-07-18 VITALS
WEIGHT: 184 LBS | SYSTOLIC BLOOD PRESSURE: 100 MMHG | BODY MASS INDEX: 29.57 KG/M2 | OXYGEN SATURATION: 99 % | HEIGHT: 66 IN | HEART RATE: 72 BPM | DIASTOLIC BLOOD PRESSURE: 76 MMHG

## 2024-07-18 DIAGNOSIS — K59.04 CHRONIC IDIOPATHIC CONSTIPATION: Primary | ICD-10-CM

## 2024-07-18 PROCEDURE — 99213 OFFICE O/P EST LOW 20 MIN: CPT | Performed by: INTERNAL MEDICINE

## 2024-07-25 NOTE — PROGRESS NOTES
[facility] part of [facility]  Return Patient Progress Note      HPI:     Chief Complaint   Patient presents with    Follow - Up     6 month f/u constipation    saw pt 04/30/2024       Enedelia Bello is a 44 year old female presenting for:    Has a significant  has a past medical history of High cholesterol and Screen for colon cancer.    Here for follow up. Reports continued constipation.       Labs:   CMP:  Lab Results   Component Value Date/Time     01/18/2024 11:35 AM    K 4.7 01/18/2024 11:35 AM     01/18/2024 11:35 AM    CO2 29.0 01/18/2024 11:35 AM    CREATSERUM 0.81 01/18/2024 11:35 AM    CA 9.3 01/18/2024 11:35 AM    GLU 87 01/18/2024 11:35 AM    TP 7.0 01/18/2024 11:35 AM    ALB 4.3 01/18/2024 11:35 AM    ALKPHO 46 01/18/2024 11:35 AM    AST 16 01/18/2024 11:35 AM    ALT 12 01/18/2024 11:35 AM    BILT 0.9 01/18/2024 11:35 AM    TSH 3.703 01/18/2024 11:35 AM        CBC:  Lab Results   Component Value Date    WBC 5.9 01/18/2024    HGB 14.3 01/18/2024    HCT 43.0 01/18/2024    .0 01/18/2024    NEPERCENT 63.1 01/18/2024    LYPERCENT 27.4 01/18/2024    MOPERCENT 7.4 01/18/2024    EOPERCENT 1.2 01/18/2024    BAPERCENT 0.7 01/18/2024    NE 3.70 01/18/2024    LYMABS 1.60 01/18/2024    MOABSO 0.43 01/18/2024    EOABSO 0.07 01/18/2024    BAABSO 0.04 01/18/2024          Hemoglobin A1C, Microalbumin  Lab Results   Component Value Date/Time    A1C 5.3 01/18/2024 11:35 AM        Lipid panel  Lab Results   Component Value Date/Time    CHOLEST 187 01/18/2024 11:35 AM    HDL 40 01/18/2024 11:35 AM    TRIG 96 01/18/2024 11:35 AM     (H) 01/18/2024 11:35 AM    NONHDLC 147 (H) 01/18/2024 11:35 AM        Medications:  Current Outpatient Medications   Medication Sig Dispense Refill    Bisacodyl (STIMULANT LAXATIVE OR) Take by mouth.      vitamin B-12 50 MCG Oral Tab Take 1 tablet (50 mcg total) by mouth daily.      sennosides (SENOKOT) 8.6 MG Oral Tab Take 1 tablet  (8.6 mg total) by mouth 2 (two) times daily as needed. 60 tablet 1      PMH:  Past Medical History:    High cholesterol    Screen for colon cancer    repeat CLN in 10 years         PSH:  Past Surgical History:   Procedure Laterality Date    Appendectomy      Cholecystectomy      Colonoscopy  04/30/2024    Dr. Vines    Colonoscopy N/A 4/30/2024    Procedure: COLONOSCOPY;  Surgeon: FREYA Vines MD;  Location: [facility] ENDOSCOPY       Allergies:  Allergies   Allergen Reactions    Penicillins SWELLING     eyes      Social History:  Social History     Socioeconomic History    Marital status:    Tobacco Use    Smoking status: Never     Passive exposure: Never    Smokeless tobacco: Never   Vaping Use    Vaping status: Never Used   Substance and Sexual Activity    Alcohol use: Never    Drug use: Never     Social Determinants of Health     Transportation Needs: No Transportation Needs (8/6/2019)    [facility]    PRAPARE - Transportation     Lack of Transportation (Medical): No     Lack of Transportation (Non-Medical): No      Family History:  Family History   Problem Relation Age of Onset    Heart Disorder Father     No Known Problems Mother     No Known Problems Daughter     No Known Problems Son     Colon Cancer Neg               REVIEW OF SYSTEMS:   Review of Systems   Constitutional:  Negative for chills, fatigue, fever and unexpected weight change.   HENT:  Negative for congestion, ear pain, hearing loss, rhinorrhea, sinus pain and sore throat.    Eyes:  Negative for pain, redness and visual disturbance.   Respiratory:  Negative for apnea, cough, chest tightness, shortness of breath and wheezing.    Cardiovascular:  Negative for chest pain, palpitations and leg swelling.   Gastrointestinal:  Positive for constipation. Negative for abdominal distention, abdominal pain, blood in stool and nausea.   Endocrine: Negative for cold intolerance, heat  intolerance and polyuria.   Genitourinary:  Negative for dysuria, hematuria and urgency.   Musculoskeletal:  Negative for arthralgias, back pain, gait problem, joint swelling, myalgias and neck pain.   Skin:  Negative for rash and wound.   Allergic/Immunologic: Negative for food allergies and immunocompromised state.   Neurological:  Negative for dizziness, seizures, facial asymmetry, speech difficulty, weakness, light-headedness, numbness and headaches.   Hematological:  Negative for adenopathy. Does not bruise/bleed easily.   Psychiatric/Behavioral:  Negative for behavioral problems, sleep disturbance and suicidal ideas. The patient is not nervous/anxious.             PHYSICAL EXAM:   /76   Pulse 72   Ht 5' 6\" (1.676 m)   Wt 184 lb (83.5 kg)   LMP 01/09/2024   SpO2 99%   BMI 29.70 kg/m²  Estimated body mass index is 29.7 kg/m² as calculated from the following:    Height as of this encounter: 5' 6\" (1.676 m).    Weight as of this encounter: 184 lb (83.5 kg).     Wt Readings from Last 3 Encounters:   07/18/24 184 lb (83.5 kg)   04/24/24 185 lb (83.9 kg)   03/21/24 186 lb (84.4 kg)       Physical Exam  Vitals reviewed.   Constitutional:       General: She is not in acute distress.     Appearance: She is well-developed.   HENT:      Head: Normocephalic and atraumatic.   Eyes:      Conjunctiva/sclera: Conjunctivae normal.      Pupils: Pupils are equal, round, and reactive to light.   Neck:      Thyroid: No thyromegaly.   Cardiovascular:      Rate and Rhythm: Normal rate and regular rhythm.      Heart sounds: Normal heart sounds, S1 normal and S2 normal. No murmur heard.     No friction rub. No gallop.   Pulmonary:      Effort: Pulmonary effort is normal. No respiratory distress.      Breath sounds: Normal breath sounds. No wheezing or rales.   Chest:      Chest wall: No tenderness.   Abdominal:      General: Bowel sounds are normal. There is no distension.      Palpations: Abdomen is soft. There is no mass.       Tenderness: There is no abdominal tenderness. There is no guarding or rebound.   Musculoskeletal:         General: No tenderness. Normal range of motion.      Cervical back: Normal range of motion.   Lymphadenopathy:      Cervical: No cervical adenopathy.   Skin:     General: Skin is warm.      Findings: No erythema or rash.   Neurological:      Mental Status: She is alert and oriented to person, place, and time.      Cranial Nerves: No cranial nerve deficit.      Deep Tendon Reflexes: Reflexes are normal and symmetric.   Psychiatric:         Behavior: Behavior normal.         Thought Content: Thought content normal.         Judgment: Judgment normal.               ASSESSMENT AND PLAN:   Patient is a 44 year old female who presents primarily presents for:    (K59.04) Chronic idiopathic constipation  (primary encounter diagnosis)  Discussed healthy bowel routine and habits.  Increase fiber.      Senokot prescribed          Meds & Refills for this Visit:  Requested Prescriptions      No prescriptions requested or ordered in this encounter       No orders of the defined types were placed in this encounter.      Imaging & Consults:  None          Return in about 6 months (around 1/18/2025) for Symptom monitoring.  Important follow up notes/labs for next visit      Patient indicates understanding of the above recommendations and agrees to the above plan.  Reasurrance and education provided. All questions answered.    Notified to call with any questions, complications, allergies, or worsening or changing symptoms as well as any side effects or complications from the treatments .  Red flags/ ER precautions discussed.    If diagnostic labs or imaging ordered advised patient to contact my office for results  24-48 hours after completion    This note was dictated using dragon speech recognition transcription software.  Typographical and transcription errors may be present.  Please call if any questions.             Caesar  MD [place]  [facility]

## (undated) DEVICE — Device: Brand: DUAL NARE NASAL CANNULAE FEMALE LUER CON 7FT O2 TUBE

## (undated) DEVICE — 60 ML SYRINGE REGULAR TIP: Brand: MONOJECT

## (undated) DEVICE — KIT CLEAN ENDOKIT 1.1OZ GOWNX2

## (undated) DEVICE — MEDI-VAC NON-CONDUCTIVE SUCTION TUBING 6MM X 1.8M (6FT.) L: Brand: CARDINAL HEALTH

## (undated) DEVICE — KIT ENDO ORCAPOD 160/180/190